# Patient Record
Sex: FEMALE | Race: WHITE | NOT HISPANIC OR LATINO
[De-identification: names, ages, dates, MRNs, and addresses within clinical notes are randomized per-mention and may not be internally consistent; named-entity substitution may affect disease eponyms.]

---

## 2019-04-10 ENCOUNTER — APPOINTMENT (OUTPATIENT)
Dept: INTERNAL MEDICINE | Facility: CLINIC | Age: 69
End: 2019-04-10
Payer: MEDICARE

## 2019-04-10 VITALS — SYSTOLIC BLOOD PRESSURE: 130 MMHG | DIASTOLIC BLOOD PRESSURE: 74 MMHG

## 2019-04-10 VITALS
HEIGHT: 62 IN | RESPIRATION RATE: 16 BRPM | BODY MASS INDEX: 27.6 KG/M2 | DIASTOLIC BLOOD PRESSURE: 80 MMHG | SYSTOLIC BLOOD PRESSURE: 142 MMHG | WEIGHT: 150 LBS

## 2019-04-10 DIAGNOSIS — Z72.0 TOBACCO USE: ICD-10-CM

## 2019-04-10 DIAGNOSIS — R49.0 DYSPHONIA: ICD-10-CM

## 2019-04-10 DIAGNOSIS — Z87.09 PERSONAL HISTORY OF OTHER DISEASES OF THE RESPIRATORY SYSTEM: ICD-10-CM

## 2019-04-10 PROCEDURE — 99406 BEHAV CHNG SMOKING 3-10 MIN: CPT

## 2019-04-10 PROCEDURE — 99203 OFFICE O/P NEW LOW 30 MIN: CPT

## 2019-04-10 RX ORDER — METHYLPREDNISOLONE 4 MG/1
4 TABLET ORAL
Qty: 1 | Refills: 0 | Status: ACTIVE | COMMUNITY
Start: 2019-04-10 | End: 1900-01-01

## 2019-04-10 RX ORDER — DOXYCYCLINE HYCLATE 100 MG/1
100 CAPSULE ORAL DAILY
Qty: 14 | Refills: 0 | Status: COMPLETED | COMMUNITY
Start: 2019-04-10 | End: 2019-04-24

## 2019-04-10 NOTE — PHYSICAL EXAM
[No Lymphadenopathy] : no lymphadenopathy [Supple] : supple [No Respiratory Distress] : no respiratory distress  [Clear to Auscultation] : lungs were clear to auscultation bilaterally [Normal Rate] : normal rate  [Regular Rhythm] : with a regular rhythm [Soft] : abdomen soft [Normal Oropharynx] : the oropharynx was normal [Normal TMs] : both tympanic membranes were normal [de-identified] : hoarse [de-identified] : hoarse

## 2019-04-10 NOTE — HISTORY OF PRESENT ILLNESS
[FreeTextEntry8] : 2-3 days of cough/congestion--hx chronic recurrent sinus--hx smoking --cessation discussed--pt not interested. Hoarseness which is subacute, worse past few days. \par getting preventive gyn and colon screening

## 2019-04-10 NOTE — ASSESSMENT
[FreeTextEntry1] : abx and medrol\par advised to see ent if hoarseness doesn’t resolve in 1-2 wks\par cpx within 4-5 months

## 2019-04-10 NOTE — COUNSELING
[Discussed Risks and Advised to Quit Smoking] : Discussed risks and advised to quit smoking [Discussed Cessation Medication] : cessation medication was discussed [Discussed Cessation Strategies] : cessation strategies were discussed [Tobacco Use Cessation Intermediate Greater Than 3 Minutes Up to 10 Minutes] : Tobacco Use Cessation Intermediate Greater Than 3 Minutes Up to 10 Minutes

## 2020-11-09 ENCOUNTER — TRANSCRIPTION ENCOUNTER (OUTPATIENT)
Age: 70
End: 2020-11-09

## 2020-12-21 PROBLEM — Z87.09 HISTORY OF UPPER RESPIRATORY INFECTION: Status: RESOLVED | Noted: 2019-04-10 | Resolved: 2020-12-21

## 2021-07-20 ENCOUNTER — APPOINTMENT (OUTPATIENT)
Dept: ORTHOPEDIC SURGERY | Facility: CLINIC | Age: 71
End: 2021-07-20
Payer: MEDICARE

## 2021-07-20 VITALS — HEIGHT: 62 IN | BODY MASS INDEX: 29.44 KG/M2 | WEIGHT: 160 LBS

## 2021-07-20 DIAGNOSIS — M25.561 PAIN IN RIGHT KNEE: ICD-10-CM

## 2021-07-20 PROCEDURE — 73564 X-RAY EXAM KNEE 4 OR MORE: CPT | Mod: RT

## 2021-07-20 PROCEDURE — 99072 ADDL SUPL MATRL&STAF TM PHE: CPT

## 2021-07-20 PROCEDURE — 99203 OFFICE O/P NEW LOW 30 MIN: CPT

## 2021-07-20 NOTE — DISCUSSION/SUMMARY
[de-identified] : This is a 70-year-old female with right knee pain, most likely related to localized inflammation from prolonged activities.  She otherwise is asymptomatic, therefore I recommended conservative management with activity modification, avoiding deep crouching, and NSAIDs as needed.  May follow-up on a as needed basis.  If her symptoms persist we may consider an MRI.

## 2021-07-20 NOTE — DATA REVIEWED
[de-identified] : 7/20/2021–right knee x-rays (AP, lateral, sunrise, [place]): There are no fractures, dislocations, or osseous lesions.  There is no significant degenerative arthritic changes.

## 2021-07-20 NOTE — HISTORY OF PRESENT ILLNESS
[FreeTextEntry1] : This is a 70F with a history of hypertension and hyperlipidemia, current smoker (30+ years), who is presenting for evaluation of several months of anterior right knee pain.  She has no pain at rest and experiences the pain only with prolonged standing, up to 5 out of 10 in severity.  She says the pain is more of a discomfort and stiffness rather than pain.  She has tried Advil in the past with minimal relief.  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  Otherwise denies any constitutional symptoms.

## 2022-08-04 ENCOUNTER — RX ONLY (RX ONLY)
Age: 72
End: 2022-08-04

## 2022-08-04 ENCOUNTER — OFFICE (OUTPATIENT)
Dept: URBAN - METROPOLITAN AREA CLINIC 93 | Facility: CLINIC | Age: 72
Setting detail: OPHTHALMOLOGY
End: 2022-08-04
Payer: MEDICARE

## 2022-08-04 DIAGNOSIS — H25.11: ICD-10-CM

## 2022-08-04 DIAGNOSIS — H52.4: ICD-10-CM

## 2022-08-04 PROBLEM — Z96.1 PSEUDOPHAKIA: Status: ACTIVE | Noted: 2022-08-04

## 2022-08-04 PROCEDURE — 99203 OFFICE O/P NEW LOW 30 MIN: CPT | Performed by: OPHTHALMOLOGY

## 2022-08-04 PROCEDURE — 92015 DETERMINE REFRACTIVE STATE: CPT | Performed by: OPHTHALMOLOGY

## 2022-08-04 ASSESSMENT — KERATOMETRY
OD_K2POWER_DIOPTERS: 42.50
OS_AXISANGLE_DEGREES: 056
OS_K2POWER_DIOPTERS: 43.00
OD_K1POWER_DIOPTERS: 42.25
OD_AXISANGLE_DEGREES: 171
OS_K1POWER_DIOPTERS: 42.50

## 2022-08-04 ASSESSMENT — REFRACTION_AUTOREFRACTION
OD_AXIS: 080
OS_AXIS: 117
OS_SPHERE: +0.25
OD_CYLINDER: -1.50
OD_SPHERE: +1.25
OS_CYLINDER: -0.75

## 2022-08-04 ASSESSMENT — REFRACTION_CURRENTRX
OD_AXIS: 090
OD_SPHERE: +1.25
OD_CYLINDER: -1.00
OS_AXIS: 170
OS_OVR_VA: 20/
OS_CYLINDER: -2.75
OS_SPHERE: +0.25
OD_OVR_VA: 20/

## 2022-08-04 ASSESSMENT — REFRACTION_MANIFEST
OD_AXIS: 090
OS_ADD: +2.75
OS_SPHERE: +0.25
OS_AXIS: 115
OD_ADD: +2.75
OD_SPHERE: +1.25
OD_CYLINDER: -1.00
OS_CYLINDER: -0.25
OS_VA1: 20/20-
OD_VA1: 20/30-

## 2022-08-04 ASSESSMENT — SPHEQUIV_DERIVED
OS_SPHEQUIV: -0.125
OD_SPHEQUIV: 0.5
OD_SPHEQUIV: 0.75
OS_SPHEQUIV: 0.125

## 2022-08-04 ASSESSMENT — AXIALLENGTH_DERIVED
OD_AL: 23.7135
OS_AL: 23.9206
OD_AL: 23.8124
OS_AL: 23.8209

## 2022-08-04 ASSESSMENT — CONFRONTATIONAL VISUAL FIELD TEST (CVF)
OD_FINDINGS: FULL
OS_FINDINGS: FULL

## 2022-08-04 ASSESSMENT — VISUAL ACUITY
OS_BCVA: 20/30-
OD_BCVA: 20/20-2

## 2023-08-30 ENCOUNTER — NON-APPOINTMENT (OUTPATIENT)
Age: 73
End: 2023-08-30

## 2025-02-10 ENCOUNTER — NON-APPOINTMENT (OUTPATIENT)
Age: 75
End: 2025-02-10

## 2025-02-13 ENCOUNTER — APPOINTMENT (OUTPATIENT)
Dept: ORTHOPEDIC SURGERY | Facility: CLINIC | Age: 75
End: 2025-02-13
Payer: MEDICARE

## 2025-02-13 VITALS — HEIGHT: 61 IN | WEIGHT: 130 LBS | BODY MASS INDEX: 24.55 KG/M2

## 2025-02-13 DIAGNOSIS — S92.152A DISPLACED AVULSION FRACTURE (CHIP FRACTURE) OF LEFT TALUS, INITIAL ENCOUNTER FOR CLOSED FRACTURE: ICD-10-CM

## 2025-02-13 PROCEDURE — L4350: CPT | Mod: LT

## 2025-02-13 PROCEDURE — 99204 OFFICE O/P NEW MOD 45 MIN: CPT | Mod: 25

## 2025-02-13 PROCEDURE — 28430 CLTX TALUS FRACTURE W/O MNPJ: CPT | Mod: LT

## 2025-03-12 ENCOUNTER — APPOINTMENT (OUTPATIENT)
Dept: ORTHOPEDIC SURGERY | Facility: CLINIC | Age: 75
End: 2025-03-12
Payer: MEDICARE

## 2025-03-12 DIAGNOSIS — S92.152D DISPLACED AVULSION FRACTURE (CHIP FRACTURE) OF LEFT TALUS, SUBSEQUENT ENCOUNTER FOR FRACTURE WITH ROUTINE HEALING: ICD-10-CM

## 2025-03-12 PROCEDURE — 73610 X-RAY EXAM OF ANKLE: CPT | Mod: LT

## 2025-03-12 PROCEDURE — 99024 POSTOP FOLLOW-UP VISIT: CPT
